# Patient Record
Sex: FEMALE | Race: WHITE | NOT HISPANIC OR LATINO | URBAN - METROPOLITAN AREA
[De-identification: names, ages, dates, MRNs, and addresses within clinical notes are randomized per-mention and may not be internally consistent; named-entity substitution may affect disease eponyms.]

---
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
## 2025-05-20 ENCOUNTER — OFFICE VISIT (OUTPATIENT)
Age: 20
End: 2025-05-20
Payer: COMMERCIAL

## 2025-05-20 VITALS
SYSTOLIC BLOOD PRESSURE: 129 MMHG | WEIGHT: 147 LBS | DIASTOLIC BLOOD PRESSURE: 72 MMHG | HEIGHT: 66 IN | HEART RATE: 69 BPM | BODY MASS INDEX: 23.63 KG/M2

## 2025-05-20 DIAGNOSIS — R07.9 CHEST PAIN, UNSPECIFIED TYPE: ICD-10-CM

## 2025-05-20 DIAGNOSIS — R00.2 PALPITATIONS: Primary | ICD-10-CM

## 2025-05-20 DIAGNOSIS — I47.10 PAROXYSMAL SVT (SUPRAVENTRICULAR TACHYCARDIA): ICD-10-CM

## 2025-05-20 PROCEDURE — 99204 OFFICE O/P NEW MOD 45 MIN: CPT | Performed by: INTERNAL MEDICINE

## 2025-05-20 RX ORDER — PROPRANOLOL HYDROCHLORIDE 60 MG/1
60 CAPSULE, EXTENDED RELEASE ORAL DAILY
Qty: 90 CAPSULE | Refills: 3 | Status: SHIPPED | OUTPATIENT
Start: 2025-05-20

## 2025-05-20 RX ORDER — DROSPIRENONE, ETHINYL ESTRADIOL AND LEVOMEFOLATE CALCIUM AND LEVOMEFOLATE CALCIUM 3-0.02(24)
1 KIT ORAL DAILY
COMMUNITY
Start: 2025-04-22

## 2025-05-20 NOTE — PROGRESS NOTES
"Chief Complaint  Chest Pain, Shortness of Breath, Arm Pain, and Establish Care    Subjective        Selena Crane presents to [facility] CARDIOLOGY  History of present illness:    Patient is a 20-year-old female with possible history of SVT.  She states she was possibly diagnosed with a back and 5th-6th grade and saw a cardiologist in [place].  It sounds more like they had a feeling that is what it was but I am not sure they were able to ever catch it with a monitor.  She was put on metoprolol succinate 25 daily in 7th or 8th grade.  When she is on this medication she does not have it.  If she is off the medication it can come a few times a year.  It can occur sitting or standing.  She states it would last until she goes to sleep and then \"reset itself.\"  She states it will go anywhere from 180 to 220 bpm.  She also notes some left-sided chest pain that is under her breast and can be dull or sharp.  It can occur sitting still or lying and lasts for 15 minutes intermittently.  She did present to the emergency department back on November 20, 2024 with the symptoms and had negative workup including troponins.  She has had multiple echocardiograms in the past.  She does not smoke or drink alcohol.  Mother and father have no heart disease.  She does note that she has bad anxiety.      History reviewed. No pertinent past medical history.      History reviewed. No pertinent surgical history.       Social History     Socioeconomic History    Marital status: Single   Tobacco Use    Smoking status: Never    Smokeless tobacco: Never   Vaping Use    Vaping status: Never Used   Substance and Sexual Activity    Alcohol use: Never    Drug use: Never    Sexual activity: Defer         History reviewed. No pertinent family history.       No Known Allergies         Current Outpatient Medications:     Drospiren-Eth Estrad-Levomefol 3-0.02-0.451 MG tablet, Take 1 tablet by mouth Daily., Disp: , Rfl:     " "propranolol LA (Inderal LA) 60 MG 24 hr capsule, Take 1 capsule by mouth Daily., Disp: 90 capsule, Rfl: 3      ROS:  Cardiac review of systems positive for palpitations and atypical chest pain.    Objective     /72   Pulse 69   Ht 167.6 cm (66\")   Wt 66.7 kg (147 lb)   BMI 23.73 kg/m²       General Appearance:   well developed  well nourished  HENT:   oropharynx moist  lips not cyanotic  Respiratory:  no respiratory distress  normal breath sounds  no rales  Cardiovascular:  no jugular venous distention  regular rhythm  S1 normal, S2 normal  no S3, no S4   no murmur  no rub, no thrill  No carotid bruit  pedal pulses normal  lower extremity edema: none    Musculoskeletal:  no clubbing of fingers.   normocephalic, head atraumatic  Skin:   warm, dry  Psychiatric:  judgement and insight appropriate  normal mood and affect    ECHO:    STRESS:    CATH:  No results found for this or any previous visit.    BMP:   No results found for: \"GLUCOSE\", \"BUN\", \"CREATININE\", \"NA\", \"K\", \"CL\", \"CO2\", \"CALCIUM\", \"BCR\", \"ANIONGAP\", \"EGFR\"  LIPIDS:  No results found for: \"CHOL\", \"TRIG\", \"HDL\", \"LDL\", \"VLDL\", \"LDLHDL\"      Procedures             ASSESSMENT:  Diagnoses and all orders for this visit:    1. Palpitations (Primary)    2. Paroxysmal SVT (supraventricular tachycardia)    3. Chest pain, unspecified type    Other orders  -     propranolol LA (Inderal LA) 60 MG 24 hr capsule; Take 1 capsule by mouth Daily.  Dispense: 90 capsule; Refill: 3         PLAN:    1.  Patient's chest pain sounds very atypical and I do not think it represents a cardiac source.  It can occur sitting or lying down.  I did review the labs and EKG from her emergency department visit in November 2024 which were normal.  2.  I am not exactly sure if she was diagnosed definitively with an SVT as the patient's episodes were very rare.  It does sound like it could be as she states her heart rate can go from 180 to 220 bpm.  It still remains very rare and " would be difficult to catch at this time with a monitor.  She does note is been going on since the 5th or 6th grade and she has been on a beta-blocker since the 7th or 8th grade.  When she is on the beta-blocker does not seem to happen.  3.  Patient does note significant anxiety.  We are first going to try propranolol 60 daily.  If she has problems with this we will just switch it to the metoprolol succinate 25 daily.  4.  Blood pressures under good control.  5.  Patient had echocardiogram 7/22/2022 which showed normal heart function.  She had a most trivial to mild mitral regurgitation which I told her was a normal finding.  6.  Patient does not smoke.      Return in about 1 year (around 5/20/2026) for laura perales.     Patient was given instructions and counseling regarding her condition or for health maintenance advice. Please see specific information pulled into the AVS if appropriate.         Gregg Galo MD   5/20/2025  15:44 EDT